# Patient Record
Sex: FEMALE | URBAN - METROPOLITAN AREA
[De-identification: names, ages, dates, MRNs, and addresses within clinical notes are randomized per-mention and may not be internally consistent; named-entity substitution may affect disease eponyms.]

---

## 2022-02-18 ENCOUNTER — TELEPHONE ENCOUNTER (OUTPATIENT)
Dept: URBAN - METROPOLITAN AREA CLINIC 72 | Facility: CLINIC | Age: 37
End: 2022-02-18

## 2022-04-13 ENCOUNTER — WEB ENCOUNTER (OUTPATIENT)
Dept: URBAN - METROPOLITAN AREA CLINIC 113 | Facility: CLINIC | Age: 37
End: 2022-04-13

## 2022-04-14 ENCOUNTER — OFFICE VISIT (OUTPATIENT)
Dept: URBAN - METROPOLITAN AREA CLINIC 113 | Facility: CLINIC | Age: 37
End: 2022-04-14
Payer: OTHER GOVERNMENT

## 2022-04-14 VITALS
HEIGHT: 62 IN | WEIGHT: 170 LBS | DIASTOLIC BLOOD PRESSURE: 76 MMHG | RESPIRATION RATE: 18 BRPM | BODY MASS INDEX: 31.28 KG/M2 | SYSTOLIC BLOOD PRESSURE: 111 MMHG | TEMPERATURE: 97.7 F | HEART RATE: 81 BPM

## 2022-04-14 DIAGNOSIS — K64.4 ANAL SKIN TAG: ICD-10-CM

## 2022-04-14 DIAGNOSIS — K62.5 RECTAL BLEEDING: ICD-10-CM

## 2022-04-14 PROCEDURE — 99203 OFFICE O/P NEW LOW 30 MIN: CPT

## 2022-04-14 RX ORDER — LEVOTHYROXINE SODIUM 75 UG/1
1 CAPSULE IN THE MORNING ON AN EMPTY STOMACH CAPSULE ORAL ONCE A DAY
Status: ACTIVE | COMMUNITY

## 2022-04-14 NOTE — HPI-TODAY'S VISIT:
36-year-old female with a history of hypothyroidism is referred by Chelsey Devine NP for evaluation of painless rectal bleeding.  A copy of today's visit will be forwarded to the referring provider.  Labs 10/21/2021:Normal CBC, elevated hemoglobin A1c 5.7, negative urinalysis, normal thyroid panel, normal lipid panel, normal CMP, normal B12 and folate levels.   Patient's first episode of rectal bleeding was following her pregnancy in January 2019. The rectal bleeding may occur right before or during her period. The bleeding is intermittent and bright red. It has occurred only three times this year. The blood is sometimes on the toilet paper when wiping and can occasional drip into the commode. She will have mild rectal pain during periods of constipation otherwise no pain. She usually has a bowel movement every morning after coffee and cereal. Stool frequency may increase during her period. She denies abdominal pain. She believes she has a history of hemorrhoids after pregnancy. No nausea or vomiting. She denies any melena. She denies a family history of colon cancer or other GI malignancy. No family history of colon polyps. She has never had a colonoscopy or EGD. She is not currently on fiber.

## 2022-08-30 ENCOUNTER — WEB ENCOUNTER (OUTPATIENT)
Dept: URBAN - METROPOLITAN AREA CLINIC 113 | Facility: CLINIC | Age: 37
End: 2022-08-30

## 2022-09-01 ENCOUNTER — DASHBOARD ENCOUNTERS (OUTPATIENT)
Age: 37
End: 2022-09-01

## 2022-09-01 ENCOUNTER — OFFICE VISIT (OUTPATIENT)
Dept: URBAN - METROPOLITAN AREA CLINIC 113 | Facility: CLINIC | Age: 37
End: 2022-09-01
Payer: OTHER GOVERNMENT

## 2022-09-01 VITALS
DIASTOLIC BLOOD PRESSURE: 78 MMHG | RESPIRATION RATE: 16 BRPM | HEIGHT: 62 IN | SYSTOLIC BLOOD PRESSURE: 114 MMHG | BODY MASS INDEX: 32.76 KG/M2 | TEMPERATURE: 97.7 F | HEART RATE: 75 BPM | WEIGHT: 178 LBS

## 2022-09-01 DIAGNOSIS — K62.5 RECTAL BLEEDING: ICD-10-CM

## 2022-09-01 DIAGNOSIS — K64.4 ANAL SKIN TAG: ICD-10-CM

## 2022-09-01 PROCEDURE — 99213 OFFICE O/P EST LOW 20 MIN: CPT

## 2022-09-01 RX ORDER — LEVOTHYROXINE SODIUM 75 UG/1
1 CAPSULE IN THE MORNING ON AN EMPTY STOMACH CAPSULE ORAL ONCE A DAY
Status: ACTIVE | COMMUNITY

## 2022-09-01 RX ORDER — MULTIVITAMIN
1 TABLET TABLET ORAL ONCE A DAY
Status: ACTIVE | COMMUNITY

## 2022-09-01 NOTE — HPI-OTHER HISTORIES
Labs 10/21/2021:Normal CBC, elevated hemoglobin A1c 5.7, negative urinalysis, normal thyroid panel, normal lipid panel, normal CMP, normal B12 and folate levels.

## 2022-09-01 NOTE — HPI-TODAY'S VISIT:
36-year-old female with a history of hypothyroidism is referred by Chelsey Devine NP for evaluation of painless rectal bleeding.  A copy of today's visit will be forwarded to the referring provider.  Patient's first episode of rectal bleeding was following her pregnancy in January 2019. The rectal bleeding may occur right before or during her period. The bleeding is intermittent and bright red. It has occurred only three times this year. The blood is sometimes on the toilet paper when wiping and can occasional drip into the commode. She will have mild rectal pain during periods of constipation otherwise no pain. She usually has a bowel movement every morning after coffee and cereal. Stool frequency may increase during her period. She denies abdominal pain. She believes she has a history of hemorrhoids after pregnancy. No nausea or vomiting. She denies any melena. She denies a family history of colon cancer or other GI malignancy. No family history of colon polyps. She has never had a colonoscopy or EGD. She is not currently on fiber.  Interval history: 37-year-old female presents for follow-up.  She was last seen on 4/14/2022 as a referral for painless rectal bleeding.  She reported intermittent, infrequent rectal bleeding noted when wiping and occasionally in the commode.  She does have a history of hemorrhoids following childbirth in the past.  Suspected benign anorectal origin such as hemorrhoids considering lack of alarm symptoms such as abdominal pain, change in bowel habits or weight loss.  Rectal exam revealed external anal skin tags likely residual tissue from previous hemorrhoids.  She was started on Benefiber 1 to 2 tablespoons once daily.  If bleeding persists despite daily fiber we would then consider colonoscopy.  Labs 4/14/2022: normal CBC, Hgb A1c 5.7, normal lipid panel, normal CMP, normal TSH, large amounts of blood in the urine, elevated WBCs in urine, elevated RBCs in the urine, moderate epithelial cells in the urine, few bacteria in the urine.   Per our records, patient tested positive for Covid-19 in June.  Patient states rectal bleeding has markedly improved with addition of daily fiber. She only had one recurrence following starting a Calcium/vitamin D/Zinc supplement. She has since discontinued the supplement without recurrences. Bowel movements are regular. She denies abdominal pain,  nausea or vomiting. No heartburn or dysphagia.